# Patient Record
(demographics unavailable — no encounter records)

---

## 2025-05-09 NOTE — HISTORY OF PRESENT ILLNESS
[de-identified] : 05/09/2025: Patient presents today for a new injury visit for BL knee pain. States he started getting pain a couple years ago. Does not recall any specific injury. NO treatment to date.

## 2025-05-09 NOTE — ASSESSMENT
[FreeTextEntry1] : Presents for exacerbation of advanced bilateral varus knee arthritis.  He is very active with softball.  No recent treatments.  No injury per se.  I reviewed the x-rays with the patient in detail today.  He is not ready for total knee arthroplasty at this time as the symptoms are not severe enough.  Discussed the comprehensive treatment plan.  No meloxicam 15 mg prescription for maintenance therapy sent to the pharmacy.  Side effects reviewed.  Will consult with GP regarding chronic long-term use.  In order to alleviate the acute symptoms a corticosteroid injection is planned during today's visit.  He has had difficulty with sporting activities and has not been able to run due to the pain.  Also discussed viscosupplementation.  Kindly request Euflexxa series to both knees.  Will reassess in 2 weeks and decide on next steps.  If not approved by the insurance we discussed alternative viscosupplementation options.  The patient's current medication management of their orthopedic diagnosis was reviewed today: There is a moderate risk of morbidity with further treatment, especially from use of prescription strength medications and possible side effects of these medications which include upset stomach with oral medications, skin reactions to topical medications and cardiac/renal/diabetes issues with long term use.  -The patient's diagnosis was reviewed in detail. Explained this is a chronic, progressive deteriorating condition that may need treatment from time to time as the flare-ups occur. -The natural progression of Osteoarthritis was explained to the patient. We discussed the possible treatment options from conservative to operative. -We discussed prescription strength NSAIDs, Glucosamine and Chondroitin sulfate, and Physical Therapy as well different types of injections including viscosupplementation. -We also discussed that at some point the condition may progress and require a total knee replacement.

## 2025-05-09 NOTE — PROCEDURE
[FreeTextEntry3] : - Large joint injection was performed of the BILATERAL knee.  - The indication for this procedure was pain and inflammation. Patient has tried OTC's including aspirin, Ibuprofen, Aleve, etc or prescription NSAIDS, and/or exercises at home and/or physical therapy without satisfactory response, patient had decreased mobility in the joint and the risks benefits, and alternatives have been discussed, and verbal consent was obtained. The site was prepped with alcohol, betadine, ethyl chloride sprayed topically and sterile technique used.  - An injection of Betamethasone 2cc; Marcaine 5cc injected. - Patient was advised to call if redness, pain or fever occur, apply ice for 15 minutes out of every hour for the next 12-24 hours as tolerated and patient was advised to rest the joint(s) for 2 days.  - Ultrasound guidance was indicated for this patient due to prior failure or difficult injection. All ultrasound images have been permanently captured and stored accordingly in our picture archiving and communication system. Visualization of the needle and placement of injection was performed without complication.

## 2025-05-30 NOTE — HISTORY OF PRESENT ILLNESS
[7] : 7 [2] : 2 [FreeTextEntry1] : b/l knees [FreeTextEntry5] : 05/30/25: Pt is here to follow up for b/l knees. Pt states CSI form last visit helped a little bit. Pain stayed the same. Would like to start gel injections.

## 2025-05-30 NOTE — ASSESSMENT
[FreeTextEntry1] : He reports relief after the corticosteroid injection but the symptoms persist.  This consistent again with advanced bilateral knee arthritis exacerbation.  Due to the persistent symptoms I have started him on ibuprofen 600 mg prescription as the meloxicam prescription was not effective.  He tells me the ibuprofen is usually more effective.  We are currently waiting on the viscosupplementation approval which we plan on starting to both knees.  In the interim he will continue with the home exercise program.  Again not ready for total knee arthroplasty but eventually may benefit from consultation so he can familiarize himself with the procedure further.  The patient's current medication management of their orthopedic diagnosis was reviewed today: There is a moderate risk of morbidity with further treatment, especially from use of prescription strength medications and possible side effects of these medications which include upset stomach with oral medications, skin reactions to topical medications and cardiac/renal/diabetes issues with long term use.

## 2025-05-30 NOTE — IMAGING
[de-identified] : No effusion, varus deformity Medial joint line tenderness palpation bilaterally Positive crepitus bilaterally Nonantalgic gait

## 2025-06-13 NOTE — IMAGING
[de-identified] : No effusion, varus deformity Medial joint line tenderness palpation bilaterally Positive crepitus bilaterally Nonantalgic gait

## 2025-06-13 NOTE — PROCEDURE
[FreeTextEntry3] : Large joint injection was performed of the bilateral knee.  The indication for this procedure was pain, inflammation and x-ray evidence of Osteoarthritis on this or prior visit. The site was prepped with alcohol, betadine, ethyl chloride sprayed topically and sterile technique used.  An injection of EUFLEXXA 2ml, series #1 was used.   Patient was advised to call if redness, pain or fever occur, apply ice for 15 minutes out of every hour for the next 12-24 hours as tolerated and patient was advised to rest the joint(s) for 2 days. Patient has tried OTC's including aspirin, Ibuprofen, Aleve, etc or prescription NSAIDS, and/or exercises at home and/or physical therapy without satisfactory response, patient had decreased mobility in the joint and the risks benefits, and alternatives have been discussed, and verbal consent was obtained.  Ultrasound guidance was indicated for this patient due to prior failure or difficult injection. All ultrasound images have been permanently captured and stored accordingly in our picture archiving and communication system. Visualization of the needle and placement of injection was performed without complication.

## 2025-06-13 NOTE — HISTORY OF PRESENT ILLNESS
[de-identified] : 06/13/2025: b/l knee euflexxa inj #1, states that CSI gave barely any relief [FreeTextEntry1] : b/l knees

## 2025-06-13 NOTE — ASSESSMENT
[FreeTextEntry1] : here for follow up of acute exacerbation of chronic knee arthritis Since last visit the pain has improved slightly Received corticosteroid injections on may 9th with partial relief. Still remains symptomatic We discussed alternative treatment options in detail. They elect to proceed with visco supplementation series as part of a broad arthritis treatment plan. Risks, benefits, and alternatives discussed euflexxa bilateral knees #1 administered today - tolerated procedure well - post procedure precautions discussed As symptoms are slightly improved we advised to wean down on the frequency of their prescription anti-inflammatory medication. Has been taking ibuprofen 600mg - side effects reviewed - instructed how to take medication - no side effects as of yet  Continue to work on a healthy diet and low impact exercise during visco supplementation series. Follow up in a week to reassess symptoms and next steps  I am working today under the supervision of Dr. Malin and I am following the plan of care of Dr. Malin as described by him on this date. Progress note completed by Meka Sullivan PA-C under the supervision of Dr. Malin.

## 2025-06-20 NOTE — PROCEDURE
[FreeTextEntry3] : Large joint injection was performed of the bilateral knee.  The indication for this procedure was pain, inflammation and x-ray evidence of Osteoarthritis on this or prior visit. The site was prepped with alcohol, betadine, ethyl chloride sprayed topically and sterile technique used.  An injection of EUFLEXXA 2ml, series #2 was used.   Patient was advised to call if redness, pain or fever occur, apply ice for 15 minutes out of every hour for the next 12-24 hours as tolerated and patient was advised to rest the joint(s) for 2 days. Patient has tried OTC's including aspirin, Ibuprofen, Aleve, etc or prescription NSAIDS, and/or exercises at home and/or physical therapy without satisfactory response, patient had decreased mobility in the joint and the risks benefits, and alternatives have been discussed, and verbal consent was obtained.  Ultrasound guidance was indicated for this patient due to prior failure or difficult injection. All ultrasound images have been permanently captured and stored accordingly in our picture archiving and communication system. Visualization of the needle and placement of injection was performed without complication.

## 2025-06-20 NOTE — HISTORY OF PRESENT ILLNESS
[de-identified] : 06/13/2025: b/l knee euflexxa inj #1, states that CSI gave barely any relief  06/20/2025: here to follow up on bl knee pain  [FreeTextEntry1] : b/l knees

## 2025-06-27 NOTE — HISTORY OF PRESENT ILLNESS
[de-identified] : 06/13/2025: b/l knee euflexxa inj #1, states that CSI gave barely any relief  06/20/2025: here to follow up on bl knee pain   06/27/2025: here to follow up on continued bl knee pain  [FreeTextEntry1] : b/l knees

## 2025-06-27 NOTE — PROCEDURE
[FreeTextEntry3] : Large joint injection was performed of the bilateral knee. The indication for this procedure was pain, inflammation and x-ray evidence of Osteoarthritis on this or prior visit. The site was prepped with alcohol, betadine, ethyl chloride sprayed topically and sterile technique used. An injection of EUFLEXXA 2ml, 20mg, series #3 was used.  Patient was advised to call if redness, pain or fever occur, apply ice for 15 minutes out of every hour for the next 12-24 hours as tolerated and patient was advised to rest the joint(s) for 2 days. Patient has tried OTC's including aspirin, Ibuprofen, Aleve, etc or prescription NSAIDS, and/or exercises at home and/or physical therapy without satisfactory response, patient had decreased mobility in the joint and the risks benefits, and alternatives have been discussed, and verbal consent was obtained. Ultrasound guidance was indicated for this patient due to prior failure or difficult injection. All ultrasound images have been permanently captured and stored accordingly in our picture archiving and communication system. Visualization of the needle and placement of injection was performed without complication.

## 2025-06-27 NOTE — ASSESSMENT
[FreeTextEntry1] : Presents for a follow-up bilateral knees.  He still reports no relief since starting the injections.  I personally evaluated the patient's overall condition today.  We discussed his treatment options.  Explained expectation is 6 weeks since finishing the injections to report any relief.  He will continue with the meloxicam 15 mg prescription as needed.  Reports no ill effects to date.  Surgical intervention may be necessary if still symptomatic after 6 weeks.  Advised to continue a home exercise program and playing sports as he has been doing since the initial visit.  Follow-up 6 weeks to reassess  The patient's current medication management of their orthopedic diagnosis was reviewed today: There is a moderate risk of morbidity with further treatment, especially from use of prescription strength medications and possible side effects of these medications which include upset stomach with oral medications, skin reactions to topical medications and cardiac/renal/diabetes issues with long term use.

## 2025-06-27 NOTE — IMAGING
[de-identified] : bILATERAL Knee Exam:  Inspection: Varus deformity, mild effusion, no warmth, no ecchymosis Palpation: Medial joint line tenderness to palpation, Negative Alycia Range of motion: 0-120 with associated crepitus